# Patient Record
Sex: FEMALE | Race: WHITE | NOT HISPANIC OR LATINO | Employment: UNEMPLOYED | URBAN - METROPOLITAN AREA
[De-identification: names, ages, dates, MRNs, and addresses within clinical notes are randomized per-mention and may not be internally consistent; named-entity substitution may affect disease eponyms.]

---

## 2023-06-17 ENCOUNTER — OFFICE VISIT (OUTPATIENT)
Dept: URGENT CARE | Age: 48
End: 2023-06-17
Payer: COMMERCIAL

## 2023-06-17 VITALS
OXYGEN SATURATION: 99 % | WEIGHT: 140 LBS | HEIGHT: 62 IN | HEART RATE: 78 BPM | BODY MASS INDEX: 25.76 KG/M2 | TEMPERATURE: 97.8 F | RESPIRATION RATE: 18 BRPM | DIASTOLIC BLOOD PRESSURE: 92 MMHG | SYSTOLIC BLOOD PRESSURE: 167 MMHG

## 2023-06-17 DIAGNOSIS — R35.0 URINARY FREQUENCY: Primary | ICD-10-CM

## 2023-06-17 LAB
SL AMB  POCT GLUCOSE, UA: NEGATIVE
SL AMB LEUKOCYTE ESTERASE,UA: ABNORMAL
SL AMB POCT BILIRUBIN,UA: NEGATIVE
SL AMB POCT BLOOD,UA: NEGATIVE
SL AMB POCT CLARITY,UA: CLEAR
SL AMB POCT COLOR,UA: YELLOW
SL AMB POCT KETONES,UA: NEGATIVE
SL AMB POCT NITRITE,UA: NEGATIVE
SL AMB POCT PH,UA: 6
SL AMB POCT SPECIFIC GRAVITY,UA: 1.01
SL AMB POCT URINE HCG: NEGATIVE
SL AMB POCT URINE PROTEIN: NEGATIVE
SL AMB POCT UROBILINOGEN: 0.2

## 2023-06-17 PROCEDURE — 81002 URINALYSIS NONAUTO W/O SCOPE: CPT

## 2023-06-17 PROCEDURE — 87086 URINE CULTURE/COLONY COUNT: CPT

## 2023-06-17 PROCEDURE — 81025 URINE PREGNANCY TEST: CPT

## 2023-06-17 PROCEDURE — 99213 OFFICE O/P EST LOW 20 MIN: CPT

## 2023-06-17 RX ORDER — ESCITALOPRAM OXALATE 20 MG/1
20 TABLET ORAL DAILY
COMMUNITY
Start: 2023-05-17

## 2023-06-17 NOTE — PATIENT INSTRUCTIONS
Your urine has been sent to culture, it will take a few days to grow  You can monitor for results on MyChart  If you have not already done so, sign up for access to your MyChart  I will call you in several days after results return  We have discussed that it is ok for me to leave a voicemail if I am unable to reach you by phone - we have confirmed the best number to reach you at  If it is not urinary related, would recommend follow up with PCP and/or OBGYN  Proceed to ER if symptoms worsen

## 2023-06-17 NOTE — PROGRESS NOTES
[facility]        NAME: Andrés Figueroa is a 49 y o  female  : 1975    MRN: [id number]  DATE: 2023  TIME: 10:32 AM    Assessment and Plan   Urinary frequency [R35 0]  1  Urinary frequency  Urine culture    POCT urine dip    POCT urine HCG        Discussed sending urine to culture  Will call patient with results and can leave message - will order antibiotics depending on culture results  Patient Instructions   Your urine has been sent to culture, it will take a few days to grow  You can monitor for results on MyChart  If you have not already done so, sign up for access to your MyChart  I will call you in several days after results return  We have discussed that it is ok for me to leave a voicemail if I am unable to reach you by phone - we have confirmed the best number to reach you at  If it is not urinary related, would recommend follow up with PCP and/or OBGYN  Proceed to ER if symptoms worsen  Chief Complaint     Chief Complaint   Patient presents with   • Possible UTI     Pelvic pressure, urinary frequency/urgency x 2/3 weeks         History of Present Illness       Pelvic pressure and pain for 2-3 weeks  States symptoms waxes and wanes, no symptoms at this time but it restarted yesterday afternoon  Had taken Azo once but did not want to mask the symptoms  Review of Systems   Review of Systems   Constitutional: Negative for chills and fever  HENT: Negative for ear pain and sore throat  Eyes: Negative for pain and visual disturbance  Respiratory: Negative for cough and shortness of breath  Cardiovascular: Negative for chest pain and palpitations  Gastrointestinal: Negative for abdominal pain and vomiting  Genitourinary: Positive for frequency and pelvic pain  Negative for difficulty urinating, dysuria, flank pain, hematuria, vaginal bleeding, vaginal discharge and vaginal pain  Musculoskeletal: Negative for arthralgias and back pain     Skin: Negative "for color change and rash  Neurological: Negative for dizziness, seizures, syncope, weakness and numbness  All other systems reviewed and are negative  Current Medications       Current Outpatient Medications:   •  BIOTIN PO, Take by mouth daily, Disp: , Rfl:   •  escitalopram (LEXAPRO) 20 mg tablet, Take 20 mg by mouth daily, Disp: , Rfl:   •  levothyroxine 75 mcg tablet, Take 75 mcg by mouth daily, Disp: , Rfl:   •  Multiple Vitamin (MULTIVITAMIN) tablet, Take 1 tablet by mouth daily, Disp: , Rfl:   •  Probiotic Product (PROBIOTIC DAILY PO), Take by mouth, Disp: , Rfl:   •  oxyCODONE-acetaminophen (PERCOCET) 5-325 mg per tablet, Take 1 tab po q4 prn pain (Patient not taking: Reported on 2023), Disp: 30 tablet, Rfl: 0  •  Vitamin D, Cholecalciferol, 1000 UNITS TABS, Take by mouth daily (Patient not taking: Reported on 2023), Disp: , Rfl:     Current Allergies     Allergies as of 2023   • (No Known Allergies)            The following portions of the patient's history were reviewed and updated as appropriate: allergies, current medications, past family history, past medical history, past social history, past surgical history and problem list      Past Medical History:   Diagnosis Date   • Anxiety    • Arthritis    • Disease of thyroid gland    • Foot pain, right    • Seasonal allergies        Past Surgical History:   Procedure Laterality Date   • ANTERIOR CRUCIATE LIGAMENT REPAIR Bilateral    •  SECTION      x2   • MO CORRJ HALLUX VALGUS W/SESMDC W/DIST METAR OSTEOT Right 12/15/2016    Procedure: BUNIONECTOMY WITH BONE CUT FIRST METATARSAL BONE, BONE CUT GREAT TOE WITH SCREWS,PLATE;  Surgeon: Sudha Covarrubias DPM;  Location: [facility] OR;  Service: Podiatry   • TUBAL LIGATION     • WISDOM TOOTH EXTRACTION         History reviewed  No pertinent family history  Medications have been verified          Objective   /92   Pulse 78   Temp 97 8 °F (36 6 °C)   Resp 18   Ht 5' 2\" " (1 575 m)   Wt 63 5 kg (140 lb)   SpO2 99%   BMI 25 61 kg/m²   No LMP recorded  Physical Exam     Physical Exam  Vitals reviewed  Constitutional:       Appearance: Normal appearance  Abdominal:      Tenderness: There is no abdominal tenderness  There is no right CVA tenderness or left CVA tenderness  Musculoskeletal:         General: Normal range of motion  Skin:     General: Skin is warm and dry  Capillary Refill: Capillary refill takes less than 2 seconds  Neurological:      General: No focal deficit present  Mental Status: She is alert and oriented to person, place, and time     Psychiatric:         Mood and Affect: Mood normal          Behavior: Behavior normal

## 2023-06-19 LAB — BACTERIA UR CULT: NORMAL

## 2025-02-05 ENCOUNTER — OFFICE VISIT (OUTPATIENT)
Dept: URGENT CARE | Age: 50
End: 2025-02-05
Payer: COMMERCIAL

## 2025-02-05 VITALS
BODY MASS INDEX: 29.26 KG/M2 | TEMPERATURE: 98.1 F | DIASTOLIC BLOOD PRESSURE: 88 MMHG | SYSTOLIC BLOOD PRESSURE: 130 MMHG | HEART RATE: 94 BPM | WEIGHT: 160 LBS | RESPIRATION RATE: 18 BRPM | OXYGEN SATURATION: 98 %

## 2025-02-05 DIAGNOSIS — R05.1 ACUTE COUGH: Primary | ICD-10-CM

## 2025-02-05 PROCEDURE — G0382 LEV 3 HOSP TYPE B ED VISIT: HCPCS | Performed by: FAMILY MEDICINE

## 2025-02-05 PROCEDURE — S9083 URGENT CARE CENTER GLOBAL: HCPCS | Performed by: FAMILY MEDICINE

## 2025-02-05 RX ORDER — LEVOTHYROXINE SODIUM 112 MCG
1 TABLET ORAL DAILY
COMMUNITY
Start: 2025-01-21

## 2025-02-05 RX ORDER — ONDANSETRON 4 MG/1
4 TABLET, ORALLY DISINTEGRATING ORAL EVERY 6 HOURS SCHEDULED
COMMUNITY
Start: 2024-12-26

## 2025-02-05 RX ORDER — GABAPENTIN 300 MG/1
300 CAPSULE ORAL 3 TIMES DAILY
COMMUNITY
Start: 2024-12-05

## 2025-02-05 RX ORDER — NORGESTIMATE AND ETHINYL ESTRADIOL 0.25-0.035
1 KIT ORAL DAILY
COMMUNITY
Start: 2025-01-30

## 2025-02-05 RX ORDER — BUSPIRONE HYDROCHLORIDE 5 MG/1
5 TABLET ORAL 3 TIMES DAILY
COMMUNITY
Start: 2024-10-25

## 2025-02-05 RX ORDER — BENZONATATE 100 MG/1
100 CAPSULE ORAL 3 TIMES DAILY PRN
Qty: 20 CAPSULE | Refills: 0 | Status: SHIPPED | OUTPATIENT
Start: 2025-02-05

## 2025-02-05 RX ORDER — LORAZEPAM 0.5 MG/1
0.5 TABLET ORAL EVERY 6 HOURS PRN
COMMUNITY
Start: 2024-12-13

## 2025-02-05 RX ORDER — METHYLPREDNISOLONE 4 MG/1
TABLET ORAL
Qty: 1 EACH | Refills: 0 | Status: SHIPPED | OUTPATIENT
Start: 2025-02-05

## 2025-02-05 RX ORDER — HYDROXYZINE HYDROCHLORIDE 25 MG/1
25 TABLET, FILM COATED ORAL EVERY 8 HOURS PRN
COMMUNITY
Start: 2024-12-12

## 2025-02-05 RX ORDER — ALBUTEROL SULFATE 90 UG/1
2 INHALANT RESPIRATORY (INHALATION) EVERY 6 HOURS PRN
Qty: 8.5 G | Refills: 0 | Status: SHIPPED | OUTPATIENT
Start: 2025-02-05

## 2025-02-05 RX ORDER — AZELASTINE HYDROCHLORIDE 137 UG/1
SPRAY, METERED NASAL
COMMUNITY
Start: 2025-01-29

## 2025-02-05 NOTE — PROGRESS NOTES
[facility]        NAME: Graciela Mejias is a 49 y.o. female  : 1975    MRN: [id number]  DATE: 2025  TIME: 6:28 PM    Assessment and Plan   Acute cough [R05.1]  1. Acute cough  methylPREDNISolone 4 MG tablet therapy pack    albuterol (ProAir HFA) 90 mcg/act inhaler    amoxicillin-clavulanate (AUGMENTIN) 875-125 mg per tablet    benzonatate (TESSALON PERLES) 100 mg capsule            Patient Instructions   Patient was educated on acute cough. Patient was educated on antibiotics, steroids, inhaler and tessalon Perles. Patient was told to eat on antibiotics. Patient was told to not take OTC anti-inflammatories while on steroids. Any chest pain or shortness of breath go to ED. Cough persist recommend chest xray. Follow up with PCP    Follow up with PCP in 3-5 days.  Proceed to  ER if symptoms worsen.    If tests have been performed at [facility], our office will contact you with results if changes need to be made to the care plan discussed with you at the visit.  [facility]    Chief Complaint     Chief Complaint   Patient presents with    Cold Like Symptoms     States has been having cough and chest congestion  States voice feels like it is going and is having some wheezing  Started yesterday with symptoms          History of Present Illness       Patient is a 49 year old female who presents today with cough, sore throat, chest tightness and shortness of breath for a few days. Denies any history of asthma or diabetes. Denies any allergies to medications.         Review of Systems   Review of Systems   Constitutional: Negative.    HENT:  Positive for congestion and sore throat.    Respiratory:  Positive for cough, chest tightness and shortness of breath.    Cardiovascular: Negative.    Psychiatric/Behavioral: Negative.           Current Medications       Current Outpatient Medications:     albuterol (ProAir HFA) 90 mcg/act inhaler, Inhale 2 puffs  every 6 (six) hours as needed for wheezing, Disp: 8.5 g, Rfl: 0    amoxicillin-clavulanate (AUGMENTIN) 875-125 mg per tablet, Take 1 tablet by mouth every 12 (twelve) hours for 7 days, Disp: 14 tablet, Rfl: 0    Azelastine HCl 137 MCG/SPRAY SOLN, SPRAY 2 SPRAYS INTO EACH NOSTRIL TWICE A DAY AS DIRECTED, Disp: , Rfl:     benzonatate (TESSALON PERLES) 100 mg capsule, Take 1 capsule (100 mg total) by mouth 3 (three) times a day as needed for cough, Disp: 20 capsule, Rfl: 0    busPIRone (BUSPAR) 5 mg tablet, Take 5 mg by mouth Three times a day, Disp: , Rfl:     gabapentin (NEURONTIN) 300 mg capsule, Take 300 mg by mouth 3 (three) times a day, Disp: , Rfl:     hydrOXYzine HCL (ATARAX) 25 mg tablet, Take 25 mg by mouth every 8 (eight) hours as needed, Disp: , Rfl:     levothyroxine 75 mcg tablet, Take 75 mcg by mouth daily, Disp: , Rfl:     LORazepam (ATIVAN) 0.5 mg tablet, Take 0.5 mg by mouth every 6 (six) hours as needed, Disp: , Rfl:     methylPREDNISolone 4 MG tablet therapy pack, Use as directed on package, Disp: 1 each, Rfl: 0    norgestimate-ethinyl estradiol (ORTHO-CYCLEN) 0.25-35 MG-MCG per tablet, Take 1 tablet by mouth daily, Disp: , Rfl:     ondansetron (ZOFRAN-ODT) 4 mg disintegrating tablet, Take 4 mg by mouth every 6 (six) hours, Disp: , Rfl:     Synthroid 112 MCG tablet, Take 1 tablet by mouth in the morning, Disp: , Rfl:     BIOTIN PO, Take by mouth daily (Patient not taking: Reported on 2/5/2025), Disp: , Rfl:     escitalopram (LEXAPRO) 20 mg tablet, Take 20 mg by mouth daily (Patient not taking: Reported on 2/5/2025), Disp: , Rfl:     Multiple Vitamin (MULTIVITAMIN) tablet, Take 1 tablet by mouth daily (Patient not taking: Reported on 2/5/2025), Disp: , Rfl:     oxyCODONE-acetaminophen (PERCOCET) 5-325 mg per tablet, Take 1 tab po q4 prn pain (Patient not taking: Reported on 2/5/2025), Disp: 30 tablet, Rfl: 0    Probiotic Product (PROBIOTIC DAILY PO), Take by mouth (Patient not taking: Reported on  2025), Disp: , Rfl:     Vitamin D, Cholecalciferol, 1000 UNITS TABS, Take by mouth daily (Patient not taking: Reported on 2025), Disp: , Rfl:     Current Allergies     Allergies as of 2025 - Reviewed 2025   Allergen Reaction Noted    Pollen extract Allergic Rhinitis 2018            The following portions of the patient's history were reviewed and updated as appropriate: allergies, current medications, past family history, past medical history, past social history, past surgical history and problem list.     Past Medical History:   Diagnosis Date    Anxiety     Arthritis     Disease of thyroid gland     Foot pain, right     Seasonal allergies        Past Surgical History:   Procedure Laterality Date    ANTERIOR CRUCIATE LIGAMENT REPAIR Bilateral      SECTION      x2    ME CORRJ HALLUX VALGUS W/SESMDC W/DIST METAR OSTEOT Right 12/15/2016    Procedure: BUNIONECTOMY WITH BONE CUT FIRST METATARSAL BONE, BONE CUT GREAT TOE WITH SCREWS,PLATE;  Surgeon: Skyler Fuentes DPM;  Location: [facility];  Service: Podiatry    TUBAL LIGATION      WISDOM TOOTH EXTRACTION         No family history on file.      Medications have been verified.        Objective   /88   Pulse 94   Temp 98.1 °F (36.7 °C)   Resp 18   Wt 72.6 kg (160 lb)   SpO2 98%   BMI 29.26 kg/m²   No LMP recorded.       Physical Exam     Physical Exam  Vitals and nursing note reviewed.   Constitutional:       Appearance: Normal appearance.   HENT:      Head: Normocephalic.      Right Ear: Tympanic membrane, ear canal and external ear normal.      Left Ear: Tympanic membrane, ear canal and external ear normal.      Mouth/Throat:      Mouth: Mucous membranes are moist.      Pharynx: Posterior oropharyngeal erythema present.   Eyes:      Extraocular Movements: Extraocular movements intact.      Pupils: Pupils are equal, round, and reactive to light.   Cardiovascular:      Rate and Rhythm: Normal rate and regular rhythm.       Heart sounds: Normal heart sounds.   Pulmonary:      Breath sounds: Normal breath sounds. No wheezing.   Neurological:      General: No focal deficit present.      Mental Status: She is alert and oriented to person, place, and time.   Psychiatric:         Mood and Affect: Mood normal.         Behavior: Behavior normal.

## 2025-02-05 NOTE — PATIENT INSTRUCTIONS
Patient was educated on acute cough. Patient was educated on antibiotics, steroids, inhaler and tessalon Perles. Patient was told to eat on antibiotics. Patient was told to not take OTC anti-inflammatories while on steroids. Any chest pain or shortness of breath go to ED. Cough persist recommend chest xray. Follow up with PCP